# Patient Record
Sex: MALE | Race: WHITE | NOT HISPANIC OR LATINO
[De-identification: names, ages, dates, MRNs, and addresses within clinical notes are randomized per-mention and may not be internally consistent; named-entity substitution may affect disease eponyms.]

---

## 2022-06-01 ENCOUNTER — APPOINTMENT (OUTPATIENT)
Dept: DERMATOLOGY | Facility: CLINIC | Age: 67
End: 2022-06-01
Payer: MEDICARE

## 2022-06-01 PROBLEM — Z00.00 ENCOUNTER FOR PREVENTIVE HEALTH EXAMINATION: Status: ACTIVE | Noted: 2022-06-01

## 2022-06-01 PROCEDURE — 99203 OFFICE O/P NEW LOW 30 MIN: CPT

## 2022-06-01 NOTE — CONSULT LETTER
[Dear  ___] : Dear ~LE, [Consult Letter:] : I had the pleasure of evaluating your patient, [unfilled]. [Sincerely,] : Sincerely, [FreeTextEntry2] : Matti Billings M.D. [FreeTextEntry1] : He has a six-month history of a dysesthesia on the right distal penile shaft area during intercourse.\par There is no obvious rash or erosions present although there are  ill-defined erythematous macules present on the right distal ventral shaft-not exactly what where the tenderness occurs.\par \par I am not certain of the exact etiology of his dysesthesia although I suspect there may be a supratentorial component.\par I am treating him with 5% lidocaine ointment prn\par \par Please see attached chart note for further details. [FreeTextEntry3] : [facility]\par \par Terry Allan MD\par [address], [address]\par [place] [address]\par Tel ([phone])\par Fax ([phone])\par Private line ([phone])\par

## 2022-06-01 NOTE — PHYSICAL EXAM
[Alert] : alert [Oriented x 3] : ~L oriented x 3 [Well Nourished] : well nourished [FreeTextEntry3] : Type II skin\par \par Penis: Ill-defined erythematous macules present on the right distal ventral shaft\par Right lateral shaft is the area where patient complains of tenderness with intercourse - no rash or ulcerations seen

## 2022-06-01 NOTE — HISTORY OF PRESENT ILLNESS
[FreeTextEntry1] : Sore on penis [de-identified] : First visit for this 67-year-old white male referred by Matti Billings M.D., with a six-month history of an "sore" on the penis.  Patient attributes this to use of Trimix injections and in which hit this area " by accident".  He self treated with moisturizers. Recently had a flareup post intercourse.  Currently applying bacitracin ointment.\par \par Patient complains of pain during intercourse.  Otherwise he is asymptomatic

## 2022-06-08 ENCOUNTER — NON-APPOINTMENT (OUTPATIENT)
Age: 67
End: 2022-06-08

## 2022-06-08 RX ORDER — LIDOCAINE 5 G/100G
5 OINTMENT TOPICAL
Qty: 1 | Refills: 2 | Status: ACTIVE | COMMUNITY
Start: 2022-06-01 | End: 1900-01-01

## 2022-07-28 ENCOUNTER — APPOINTMENT (OUTPATIENT)
Dept: DERMATOLOGY | Facility: CLINIC | Age: 67
End: 2022-07-28

## 2022-07-28 DIAGNOSIS — L30.8 OTHER SPECIFIED DERMATITIS: ICD-10-CM

## 2022-07-28 PROCEDURE — 99213 OFFICE O/P EST LOW 20 MIN: CPT

## 2022-07-28 NOTE — PHYSICAL EXAM
[Alert] : alert [Oriented x 3] : ~L oriented x 3 [Well Nourished] : well nourished [FreeTextEntry3] : Photos on patient's phone showed focal mild erythema on the right distal shaft and glans areas

## 2022-07-28 NOTE — HISTORY OF PRESENT ILLNESS
[FreeTextEntry1] : Irritation on penis [de-identified] : Followup visit for this 67-year-old white male first seen by me on June 1, 2022, with a six-month history\par of dysesthesia of the penis of uncertain etiology- especially after intercourse.\par Treated with 5% lidocaine ointment p.r.n. irritation.  Able to have intercourse without pain while using the 5% lidocaine ointment

## 2023-10-11 ENCOUNTER — APPOINTMENT (OUTPATIENT)
Dept: DERMATOLOGY | Facility: CLINIC | Age: 68
End: 2023-10-11
Payer: MEDICARE

## 2023-10-11 PROCEDURE — 99213 OFFICE O/P EST LOW 20 MIN: CPT

## 2023-10-11 RX ORDER — MINOCYCLINE HYDROCHLORIDE 100 MG/1
100 CAPSULE ORAL
Qty: 14 | Refills: 1 | Status: ACTIVE | COMMUNITY
Start: 2023-10-11 | End: 1900-01-01

## 2023-10-11 RX ORDER — HYDROCORTISONE 25 MG/G
2.5 OINTMENT TOPICAL
Qty: 1 | Refills: 2 | Status: ACTIVE | COMMUNITY
Start: 2022-07-28 | End: 1900-01-01

## 2024-03-25 ENCOUNTER — APPOINTMENT (OUTPATIENT)
Dept: DERMATOLOGY | Facility: CLINIC | Age: 69
End: 2024-03-25
Payer: MEDICARE

## 2024-03-25 DIAGNOSIS — A60.01 HERPESVIRAL INFECTION OF PENIS: ICD-10-CM

## 2024-03-25 DIAGNOSIS — L73.9 FOLLICULAR DISORDER, UNSPECIFIED: ICD-10-CM

## 2024-03-25 PROCEDURE — 99213 OFFICE O/P EST LOW 20 MIN: CPT

## 2024-03-25 RX ORDER — MINOCYCLINE HYDROCHLORIDE 100 MG/1
100 CAPSULE ORAL
Qty: 28 | Refills: 1 | Status: ACTIVE | COMMUNITY
Start: 2024-03-25 | End: 1900-01-01

## 2024-03-25 RX ORDER — MUPIROCIN 20 MG/G
2 OINTMENT TOPICAL
Qty: 1 | Refills: 2 | Status: ACTIVE | COMMUNITY
Start: 2024-03-25 | End: 1900-01-01

## 2024-03-25 NOTE — HISTORY OF PRESENT ILLNESS
[de-identified] : Follow-up visit for 69-year-old white male last seen by me on October 11, 2023, with a history of dysesthesia and possible irritant dermatitis on the penis.  Last treated with: Start minocycline 100 mg p.o. twice a day for 1 week with a possible focus of folliculitis on the penis Continue 2-1/2% hydrocortisone ointment 2-3 times a day as needed for irritation Continue 5% lidocaine ointment as needed for irritation   Has had a recent flareup.  Use 2-1/2% hydrocortisone ointment without improvement.  Now using a "triple antibiotic ointment" with improvement.

## 2024-03-25 NOTE — PLAN
[TextEntry] : Hold 2-1/2% hydrocortisone ointment Start minocycline 100 mg p.o. twice daily for up to 2 weeks Start mupirocin ointment 2% 3 times daily Herpes 1 and 2 titers-will call with results

## 2024-03-25 NOTE — PHYSICAL EXAM
[Alert] : alert [Oriented x 3] : ~L oriented x 3 [Well Nourished] : well nourished [FreeTextEntry3] : Rash on penis  Grouped bright erythematous papules present on the right proximal head of penis and a few on the right distal penile shaft

## 2024-03-26 ENCOUNTER — TRANSCRIPTION ENCOUNTER (OUTPATIENT)
Age: 69
End: 2024-03-26

## 2024-03-26 ENCOUNTER — NON-APPOINTMENT (OUTPATIENT)
Age: 69
End: 2024-03-26

## 2024-03-26 LAB
HSV 1+2 IGG SER IA-IMP: NEGATIVE
HSV 1+2 IGG SER IA-IMP: POSITIVE
HSV1 IGG SER QL: 39.1 INDEX
HSV2 IGG SER QL: 0.1 INDEX

## 2024-04-30 ENCOUNTER — APPOINTMENT (OUTPATIENT)
Dept: DERMATOLOGY | Facility: CLINIC | Age: 69
End: 2024-04-30
Payer: MEDICARE

## 2024-04-30 DIAGNOSIS — D22.71 MELANOCYTIC NEVI OF RIGHT LOWER LIMB, INCLUDING HIP: ICD-10-CM

## 2024-04-30 DIAGNOSIS — Z85.828 PERSONAL HISTORY OF OTHER MALIGNANT NEOPLASM OF SKIN: ICD-10-CM

## 2024-04-30 PROCEDURE — 99212 OFFICE O/P EST SF 10 MIN: CPT

## 2024-04-30 NOTE — ASSESSMENT
[FreeTextEntry1] : Folliculitis versus herpes progenitalis -currently in remission  Melanocytic nevus of right distal sole

## 2024-04-30 NOTE — PHYSICAL EXAM
[Alert] : alert [Oriented x 3] : ~L oriented x 3 [Well Nourished] : well nourished [FreeTextEntry3] : The following areas were examined and no significant abnormalities were seen except as noted below:  Type II skin  scalp, face, eyelids, nose, lips, ears, neck, chest, abdomen, back,groin, buttocks, right arm, left arm, right hand, left hand, right  leg, left leg, right foot, left foot   Penis: No rash seen Right distal medial sole: 2 x 2 mm bilobed brown macule-not suspicious on dermoscopy  No suspicious lesions seen

## 2024-04-30 NOTE — HISTORY OF PRESENT ILLNESS
[FreeTextEntry1] : Rash and dysesthesia on penis [de-identified] : Follow-up visit for 69-year-old white male last seen by me on March 25, 2024, with a history of dysesthesia and possible irritant dermatitis on the penis.  At the last visit, patient had group bright erythematous papules present on the right proximal head of the penis with a few on the right distal penile shaft. Diagnosed with?  Folliculitis,??  Herpes progenitalis  HSV IgG antibody: Type I-positive Type II-negative  Treated for presumed folliculitis at the time with  Start minocycline 100 mg p.o. twice daily for 2 weeks -rash improved after completing the first dose but recurred.   Patient took it a second time with improvement. Start to 2% mupirocin ointment to affected areas 3 times daily -currently using this twice a day  Patient has previously been treated with 2-1/2% hydrocortisone ointment 2 times a day as needed for irritation 5% lidocaine ointment as needed for irritation  Patient also presents for evaluation of growths. Has history of basal cell carcinoma on the nose treated by Mohs surgery in the past.

## 2024-05-15 ENCOUNTER — NON-APPOINTMENT (OUTPATIENT)
Age: 69
End: 2024-05-15

## 2024-05-31 ENCOUNTER — APPOINTMENT (OUTPATIENT)
Dept: DERMATOLOGY | Facility: CLINIC | Age: 69
End: 2024-05-31
Payer: MEDICARE

## 2024-05-31 DIAGNOSIS — N48.1 BALANITIS: ICD-10-CM

## 2024-05-31 DIAGNOSIS — R20.8 OTHER DISTURBANCES OF SKIN SENSATION: ICD-10-CM

## 2024-05-31 PROCEDURE — 99213 OFFICE O/P EST LOW 20 MIN: CPT

## 2024-05-31 NOTE — PLAN
[TextEntry] : Okay to use 2% mupirocin ointment as needed Continue 5% lidocaine ointment as needed dysesthesia  Return 11 months for full skin exam-sooner if rash flares on penis-to get viral culture

## 2024-05-31 NOTE — ASSESSMENT
[FreeTextEntry1] : Dysesthesia of penis of uncertain etiology-?  Secondary to underlying herpes infection

## 2024-05-31 NOTE — HISTORY OF PRESENT ILLNESS
[FreeTextEntry1] : Rash and dysesthesia on penis [de-identified] : Follow-up visit for 69-year-old white male last seen by me on April 30, 2024 (at which time he had a full skin exam), with a history of dysesthesia and  possible irritant dermatitis on the penis.  At the last visit, patient had group bright erythematous papules present on the right proximal head of the penis  with a few on the right distal penile shaft. Diagnosed with?  Folliculitis,??  Herpes progenitalis  HSV IgG antibody: Type I-positive Type II-negative  Treated for presumed folliculitis at the time with  Start minocycline 100 mg p.o. twice daily for 2 weeks -rash improved after completing the first dose but recurred.   Patient took it a second time with improvement. Start to 2% mupirocin ointment to affected areas 3 times daily -currently using this twice a day  Patient has previously been treated with 2-1/2% hydrocortisone ointment 2 times a day as needed for irritation 5% lidocaine ointment as needed for irritation  In the past month he has had a mild flareup.  Treated with 2% mupirocin ointment with improvement. Complains of continued "sensitivity" in the urethral meatus area.  Has history of basal cell carcinoma on the nose treated by Mohs surgery in the past.

## 2024-05-31 NOTE — PHYSICAL EXAM
[Alert] : alert [Oriented x 3] : ~L oriented x 3 [Well Nourished] : well nourished [FreeTextEntry3] : Penis: No rash seen

## 2024-07-08 ENCOUNTER — APPOINTMENT (OUTPATIENT)
Dept: DERMATOLOGY | Facility: CLINIC | Age: 69
End: 2024-07-08
Payer: MEDICARE

## 2024-07-08 DIAGNOSIS — R20.8 OTHER DISTURBANCES OF SKIN SENSATION: ICD-10-CM

## 2024-07-08 DIAGNOSIS — N48.1 BALANITIS: ICD-10-CM

## 2024-07-08 PROCEDURE — 99213 OFFICE O/P EST LOW 20 MIN: CPT

## 2024-07-08 RX ORDER — VALACYCLOVIR 1 G/1
1 TABLET, FILM COATED ORAL
Qty: 12 | Refills: 2 | Status: ACTIVE | COMMUNITY
Start: 2024-07-08 | End: 1900-01-01

## 2024-07-29 ENCOUNTER — APPOINTMENT (OUTPATIENT)
Dept: DERMATOLOGY | Facility: CLINIC | Age: 69
End: 2024-07-29
Payer: MEDICARE

## 2024-07-29 DIAGNOSIS — N48.1 BALANITIS: ICD-10-CM

## 2024-07-29 PROCEDURE — 99214 OFFICE O/P EST MOD 30 MIN: CPT

## 2024-07-29 NOTE — HISTORY OF PRESENT ILLNESS
[FreeTextEntry1] : Rash on penis [de-identified] : Follow-up visit for 69-year-old white male last seen by me on July 8, 2024, with history of dysesthesia and possible irritant dermatitis on the penis.  Diagnosed with?  Folliculitis,??  Herpes progenitalis.  HSV IgG antibody: Type I-positive Type II-negative  Treated for presumed folliculitis at 1 time with minocycline 100 mg p.o. twice daily for 2 weeks Mupirocin ointment to affected areas 3 times a day  Patient has also previously used 2  1/2% hydrocortisone ointment twice a day as needed for irritation 5% lidocaine ointment as needed for irritation  At the last visit, patient had an 8 x 5 mm erythematous patch with a healed horizontal fissure in center of the right distal ventral shaft  just proximal to the glans penis.  Treated for possible herpes.  Genitalis with valacyclovir 2 g p.o. every 12 hours for 3 days with temporary improvement.  Patient complains of a new 1 week outbreak.  Self treated with clotrimazole cream with slight improvement.

## 2024-07-29 NOTE — ASSESSMENT
[FreeTextEntry1] : Photo taken  Resume valacyclovir 2 g p.o. every 12 hours for 3 days Resume 2% mupirocin ointment to affected area 3 times a day  Return depending on the culture result  Note: Patient has appointment for second opinion with dermatologist Matt Azevedo MD on August 1, 2024 -patient to hold valacyclovir and mupirocin until after that visit  [employer] student, served as chaperone and was present for the entire skin exam.

## 2024-07-29 NOTE — PHYSICAL EXAM
[Alert] : alert [Oriented x 3] : ~L oriented x 3 [Well Nourished] : well nourished [FreeTextEntry3] : Right distal ventral shaft: Moderate bright erythematous?  Slightly eroded patch extending to the corona

## 2024-08-05 ENCOUNTER — NON-APPOINTMENT (OUTPATIENT)
Age: 69
End: 2024-08-05

## 2024-08-05 LAB
HHV SPEC CULT: NORMAL
HSV TYPE 1: NORMAL
HSV TYPE 2: NORMAL

## 2025-04-30 ENCOUNTER — APPOINTMENT (OUTPATIENT)
Dept: DERMATOLOGY | Facility: CLINIC | Age: 70
End: 2025-04-30